# Patient Record
Sex: MALE
[De-identification: names, ages, dates, MRNs, and addresses within clinical notes are randomized per-mention and may not be internally consistent; named-entity substitution may affect disease eponyms.]

---

## 2018-11-15 RX ORDER — METOPROLOL SUCCINATE 25 MG/1
TABLET, EXTENDED RELEASE ORAL
Qty: 90 TABLET | Refills: 3 | Status: SHIPPED | OUTPATIENT
Start: 2018-11-15 | End: 2019-02-22 | Stop reason: SDUPTHER

## 2018-12-04 ENCOUNTER — IMPORTED ENCOUNTER (OUTPATIENT)
Age: 63
End: 2018-12-04

## 2018-12-28 ENCOUNTER — OFFICE VISIT (OUTPATIENT)
Dept: CARDIOLOGY CLINIC | Age: 63
End: 2018-12-28

## 2018-12-28 VITALS
RESPIRATION RATE: 16 BRPM | HEART RATE: 61 BPM | HEIGHT: 71 IN | WEIGHT: 183 LBS | SYSTOLIC BLOOD PRESSURE: 126 MMHG | DIASTOLIC BLOOD PRESSURE: 78 MMHG | BODY MASS INDEX: 25.62 KG/M2

## 2018-12-28 DIAGNOSIS — R00.2 PALPITATIONS: Primary | ICD-10-CM

## 2018-12-28 PROCEDURE — 99214 OFFICE O/P EST MOD 30 MIN: CPT | Performed by: INTERNAL MEDICINE

## 2018-12-28 PROCEDURE — 93000 ELECTROCARDIOGRAM COMPLETE: CPT | Performed by: INTERNAL MEDICINE

## 2018-12-28 RX ORDER — TADALAFIL 20 MG/1
20 TABLET ORAL PRN
Status: ON HOLD | COMMUNITY
End: 2019-06-10 | Stop reason: ALTCHOICE

## 2018-12-28 NOTE — PROGRESS NOTES
CHIEF COMPLAINT: Chest pain    HISTORY OF PRESENT ILLNESS: Patient is a 58 y.o. male seen at the request of Sherwin Bloch, MD.      Patient presents for second opinion on his ongoing chest pain. States he has had exertional chest pains since last winter. Not particularly progressive, but predictable with exertion. Mild SOTO. Also has chronic issues with frequent PVC's.      Past Medical History:   Diagnosis Date    Benign prostatic hypertrophy     Cancer ([facility] [address] 2015    skin / basal / stomach     Dysphagia     ED (erectile dysfunction)     GERD (gastroesophageal reflux disease)     Heart palpitations     follows with Dr. Nahid Gill HTN (hypertension)     Hyperlipidemia, mixed     Hypothyroidism     Mitral valve prolapse 1988    Dr. Onofre Mireles yearly    Osteoarthritis     both  knee    PVC's (premature ventricular contractions)     follows with Dr. Shorty Rodriguez yearly       Patient Active Problem List   Diagnosis    Papilloma of nasopharynx    Essential hypertension    Pure hypercholesterolemia    Palpitations    Symptomatic PVCs    Acquired hypothyroidism    Atrial bigeminy       No Known Allergies    Current Outpatient Prescriptions   Medication Sig Dispense Refill    tadalafil (CIALIS) 20 MG tablet Take 20 mg by mouth as needed for Erectile Dysfunction      metoprolol succinate (TOPROL XL) 25 MG extended release tablet TAKE ONE TABLET BY MOUTH ONE TIME A DAY 90 tablet 3    levothyroxine (SYNTHROID) 25 MCG tablet Take 25 mcg by mouth Daily      aspirin EC 81 MG EC tablet Take 1 tablet by mouth daily 30 tablet 3    Coenzyme Q10 (COQ-10 PO) Take by mouth daily LD 2/6/2018      vitamin D (CHOLECALCIFEROL) 1000 UNIT TABS tablet Take 1,000 Units by mouth daily LD 2/6/2018      Multiple Vitamin (MULTI VITAMIN DAILY PO) Take by mouth daily LD 2/6/2018      simvastatin (ZOCOR) 10 MG tablet Take 10 mg by mouth daily      sucralfate (CARAFATE) 1 GM tablet Take 1 tablet by mouth 4 times daily 120 tablet 3

## 2019-02-22 RX ORDER — METOPROLOL SUCCINATE 25 MG/1
TABLET, EXTENDED RELEASE ORAL
Qty: 90 TABLET | Refills: 3 | Status: SHIPPED | OUTPATIENT
Start: 2019-02-22 | End: 2019-09-05 | Stop reason: SDUPTHER

## 2019-05-14 ENCOUNTER — PREP FOR PROCEDURE (OUTPATIENT)
Dept: SURGERY | Age: 64
End: 2019-05-14

## 2019-05-14 RX ORDER — SODIUM CHLORIDE 9 MG/ML
INJECTION, SOLUTION INTRAVENOUS CONTINUOUS
Status: CANCELLED | OUTPATIENT
Start: 2019-05-14

## 2019-05-14 NOTE — H&P
Name: Juanita Dunlap               : 1955 Sex: M  Age: 58 yrs  Acct#:  [id number]            Patient was referred by Tiera Peraza M.D..  Patient's primary care provider is Tiera Peraza M.D.. CC: Patient presents for follow up of gerd    HPI: Follow-up. Patient is s/p EGD and GERD treatment. Suspected of having Terry without proof on Bx. Symptoms have been controlled. No complaints. Not on any medications for GERD. Meds Prior to Visit:  Suprep Bowel Prep    as directed  Diltiazem HCL     Simvastatin     Viagra     Metoprolol Tartrate  25 mg  1/2 by mouth twice a day  Aspirin  81 mg   Prilosec  20 mg  twice a day  Revatio  20 mg   Synthroid  25 mcg  daily     Allergies:  NKDA    PMH:  Problem List: Screening for malignant neoplasm of colon, Dysphagia, Gastro-esophageal reflux disease with esophagitis  (Health Maintenance)  (Medical Problems)  Surgical Hx:  Removal Of [place]-Throat - (2013)  Reviewed, no changes. FH:  Father:  Alive  Thyroid Disease  Knee Injury  Gallstones. Mother:    Ovarian Cancer. Reviewed, no changes. SH:  Personal Habits:  Smoking: Patient has never smoked. Alcohol: Current Alcohol Use; Social.Drug Use: Denies Drug Use. Daily Caffeine: Uses Caffeine. Reviewed, no changes. Date: 2019  Was the patient queried about smoking behavior? Yes  No  Does the patient currently smoke? Smoking: Patient has never smoked. ROS:  Const: Denies anorexia, anxiety, fatigue, night sweats, weight gain and weight loss. Eyes: Denies eye symptoms. ENMT: Denies ear symptoms. Denies nasal symptoms. Denies mouth or throat symptoms. CV: Reports mitral valve prolapse, but denies hypertension and other cardiovascular symptoms. Resp: Denies respiratory symptoms. GI: Denies hepatitis, liver disease and other gastrointestinal symptoms. Musculo: Denies musculoskeletal symptoms. Skin: Denies skin, hair and nail symptoms. Breast: Denies breast problems.   Neuro: Denies neurologic symptoms. Psych: Denies depression and substance abuse. Endocrine: Denies diabetes, kidney disease and thyroid disease. Hema/Lymph: Denies anemia, blood disease, cancer and past transfusion. Allergy/Immuno: Denies immunosuppression. Reviewed, no changes. Wt Prior: 186lb as of 11/02/16    Exam:  Const: Appears healthy and well developed. No signs of apparent distress present. Head/Face: Atraumatic, normocephalic on inspection. Eyes: Conjunctivae pink. Pupils equal, round and reactive to light. Sclerae clear and anicteric. ENMT: External ears WNL. External nose WNL. Lips: Appear normal and healthy. Neck: Normal to inspection. Normal to palpation. No masses appreciated. Trachea midline. Thyroid is normal in size. No jugular venous distention. Resp: Respiration rate is normal. No wheezing. Clear to auscultation bilaterally. No rales or rhonchi appreciated over the lungs bilaterally. CV: Rate is regular. Rhythm is regular. S1 is normal. S2 is normal. No heart murmur appreciated. Extremities: No clubbing or cyanosis. No edema of the lower limbs bilaterally. Abdomen: No visible herniations. No abdominal scars. Positive bowel sounds in all quadrants. Abdomen is soft, nontender, and nondistended without guarding, rigidity or rebound tenderness. No palpable abdominal aneurysms present. No palpable hepatosplenomegaly. Lymph: No palpable lymphadenopathy in the cervical, supraclavicular, axillary and inguinal region(s). Musculo: Walks with a normal gait. Upper Extremities: Normal to inspection. ROM: Full ROM bilaterally. Lower Extremities: Normal to inspection. ROM: Full ROM bilaterally. Skin: Skin warm and dry with no evidence of unusual rashes or suspicious lesions. Neuro: Alert and oriented x3. Mood is normal.  Cranial Nerves: Cranial nerves II-XII grossly intact. Psych: Cognition: Judgment and insight are grossly intact.          Assessment #1: Hx K21.0 Gastro-esophageal reflux disease with

## 2019-05-28 ENCOUNTER — HOSPITAL ENCOUNTER (OUTPATIENT)
Age: 64
Discharge: HOME | End: 2019-05-28
Payer: MEDICARE

## 2019-05-28 LAB — PROSTATE SPECIFIC ANTIGEN: 0.31 NG/ML (ref 0–4)

## 2019-05-28 PROCEDURE — G0103 PSA SCREENING: HCPCS

## 2019-05-28 PROCEDURE — 36415 COLL VENOUS BLD VENIPUNCTURE: CPT

## 2019-06-06 NOTE — PROGRESS NOTES
To PRE-ADMISSION TESTING INSTRUCTIONS    The Preadmission Testing patient is instructed accordingly using the following criteria (check applicable):    ARRIVAL INSTRUCTIONS:  [x] Parking the day of Surgery is located in the Main Entrance lot. Upon entering the door, make an immediate right to the surgery reception desk    [] [id number] is available Monday through Friday 6 am to 6 pm    [x] Bring photo ID and insurance card    [] Bring in a copy of Living will or Durable Power of  papers. [x] Please be sure to arrange for responsible adult to provide transportation to and from the hospital    [x] Please arrange for responsible adult to be with you for the 24 hour period post procedure due to having anesthesia      GENERAL INSTRUCTIONS:    [x] Nothing by mouth after midnight, including gum, candy, mints or water    [x] You may brush your teeth, but do not swallow any water    [x] Take medications as instructed with 1-2 oz of water    [x] Stop herbal supplements and vitamins 5 days prior to procedure    [x] Follow preop dosing of blood thinners per physician instructions    [] Take 1/2 dose of evening insulin, but no insulin after midnight    [] No oral diabetic medications after midnight    [] If diabetic and have low blood sugar or feel symptomatic, take 1-2oz apple juice only    [] Bring inhalers day of surgery    [] Bring C-PAP/ Bi-Pap day of surgery    [] Bring urine specimen day of surgery    [x] Shower or bath with soap, lather and rinse well, AM of Surgery, no lotion, powders or creams to surgical site    [] Follow bowel prep as instructed per surgeon    [x] No tobacco products within 24 hours of surgery     [x] No alcohol or illegal drug use within 24 hours of surgery.     [x] Jewelry, body piercing's, eyeglasses, contact lenses and dentures are not permitted into surgery (bring cases)      [] Please do not wear any nail polish, make up or hair products on the day of surgery    [x] If not already done, you can expect a call from registration    [x] You can expect a call the business day prior to procedure to notify you if your arrival time changes    [x] If you receive a survey after surgery we would greatly appreciate your comments    [] Parent/guardian of a minor must accompany their child and remain on the premises  the entire time they are under our care     [] Pediatric patients may bring favorite toy, blanket or comfort item with them    [] A caregiver or family member must remain with the patient during their stay if they are mentally handicapped, have dementia, disoriented or unable to use a call light or would be a safety concern if left unattended    [x] Please notify surgeon if you develop any illness between now and time of surgery (cold, cough, sore throat, fever, nausea, vomiting) or any signs of infections  including skin, wounds, and dental.    [x]  The Outpatient Pharmacy is available to fill your prescription here on your day of surgery, ask your preop nurse for details    [x] Other instructions: Wear comfortable clothing    EDUCATIONAL MATERIALS PROVIDED:    [] PAT Preoperative Education Packet/Booklet     [] Medication List    [] Fluoroscopy Information Pamphlet    [] Transfusion bracelet applied with instructions    [] Joint replacement video reviewed    [] Shower with soap, lather and rinse well, and use CHG wipes provided the evening before surgery as instructed

## 2019-06-10 ENCOUNTER — ANESTHESIA (OUTPATIENT)
Dept: ENDOSCOPY | Age: 64
End: 2019-06-10
Payer: MEDICARE

## 2019-06-10 ENCOUNTER — ANESTHESIA EVENT (OUTPATIENT)
Dept: ENDOSCOPY | Age: 64
End: 2019-06-10
Payer: MEDICARE

## 2019-06-10 ENCOUNTER — PREP FOR PROCEDURE (OUTPATIENT)
Dept: SURGERY | Age: 64
End: 2019-06-10

## 2019-06-10 ENCOUNTER — HOSPITAL ENCOUNTER (OUTPATIENT)
Age: 64
Setting detail: OUTPATIENT SURGERY
Discharge: HOME | End: 2019-06-10
Attending: SURGERY | Admitting: SURGERY
Payer: MEDICARE

## 2019-06-10 VITALS — OXYGEN SATURATION: 95 % | SYSTOLIC BLOOD PRESSURE: 115 MMHG | DIASTOLIC BLOOD PRESSURE: 65 MMHG

## 2019-06-10 VITALS
OXYGEN SATURATION: 98 % | DIASTOLIC BLOOD PRESSURE: 73 MMHG | RESPIRATION RATE: 16 BRPM | TEMPERATURE: 97.3 F | BODY MASS INDEX: 24.36 KG/M2 | HEIGHT: 71 IN | SYSTOLIC BLOOD PRESSURE: 115 MMHG | HEART RATE: 51 BPM | WEIGHT: 174 LBS

## 2019-06-10 PROCEDURE — 3609012400 HC EGD TRANSORAL BIOPSY SINGLE/MULTIPLE: Performed by: SURGERY

## 2019-06-10 PROCEDURE — 88342 IMHCHEM/IMCYTCHM 1ST ANTB: CPT

## 2019-06-10 PROCEDURE — 6360000002 HC RX W HCPCS: Performed by: NURSE ANESTHETIST, CERTIFIED REGISTERED

## 2019-06-10 PROCEDURE — 2580000003 HC RX 258: Performed by: SURGERY

## 2019-06-10 PROCEDURE — 3700000000 HC ANESTHESIA ATTENDED CARE: Performed by: SURGERY

## 2019-06-10 PROCEDURE — 7100000010 HC PHASE II RECOVERY - FIRST 15 MIN: Performed by: SURGERY

## 2019-06-10 PROCEDURE — 2709999900 HC NON-CHARGEABLE SUPPLY: Performed by: SURGERY

## 2019-06-10 PROCEDURE — 88305 TISSUE EXAM BY PATHOLOGIST: CPT

## 2019-06-10 PROCEDURE — 7100000011 HC PHASE II RECOVERY - ADDTL 15 MIN: Performed by: SURGERY

## 2019-06-10 RX ORDER — SODIUM CHLORIDE 9 MG/ML
INJECTION, SOLUTION INTRAVENOUS CONTINUOUS
Status: DISCONTINUED | OUTPATIENT
Start: 2019-06-10 | End: 2019-06-10 | Stop reason: HOSPADM

## 2019-06-10 RX ORDER — SILDENAFIL CITRATE 20 MG/1
20 TABLET ORAL PRN
COMMUNITY

## 2019-06-10 RX ORDER — PROPOFOL 10 MG/ML
INJECTION, EMULSION INTRAVENOUS PRN
Status: DISCONTINUED | OUTPATIENT
Start: 2019-06-10 | End: 2019-06-10 | Stop reason: SDUPTHER

## 2019-06-10 RX ADMIN — PROPOFOL 200 MG: 10 INJECTION, EMULSION INTRAVENOUS at 11:53

## 2019-06-10 RX ADMIN — SODIUM CHLORIDE: 9 INJECTION, SOLUTION INTRAVENOUS at 11:50

## 2019-06-10 RX ADMIN — SODIUM CHLORIDE: 9 INJECTION, SOLUTION INTRAVENOUS at 11:36

## 2019-06-10 ASSESSMENT — PAIN DESCRIPTION - PAIN TYPE
TYPE: SURGICAL PAIN
TYPE: SURGICAL PAIN

## 2019-06-10 ASSESSMENT — PAIN SCALES - GENERAL
PAINLEVEL_OUTOF10: 0
PAINLEVEL_OUTOF10: 0

## 2019-06-10 NOTE — ANESTHESIA PRE PROCEDURE
hypertrophy     Cancer ([facility] [address] 2015    skin / basal / stomach     ED (erectile dysfunction)     GERD (gastroesophageal reflux disease)     HTN (hypertension)     Hyperlipidemia, mixed     Hypothyroidism     Mitral valve prolapse 1988    Dr. Nunu fajardo    Osteoarthritis     both  knee    PVC's (premature ventricular contractions)     follows with Dr. Sarbjit Santiago       Past Surgical History:        Procedure Laterality Date    COLONOSCOPY  12/12/2016    Dr. Lorenzo Mcclain, COLON, DIAGNOSTIC      OTHER SURGICAL HISTORY  2/27/13    excision of nasopharyngeal papilloma    UPPER GASTROINTESTINAL ENDOSCOPY  02/12/2018    VASECTOMY         Social History:    Social History     Tobacco Use    Smoking status: Never Smoker    Smokeless tobacco: Never Used   Substance Use Topics    Alcohol use: Yes     Comment: social                                Counseling given: Not Answered      Vital Signs (Current):   Vitals:    06/06/19 1038   Weight: 174 lb (78.9 kg)   Height: 5' 11\" (1.803 m)                                              BP Readings from Last 3 Encounters:   12/28/18 126/78   02/12/18 113/70   11/17/17 130/80       NPO Status: Time of last liquid consumption: 2200                                                                               BMI:   Wt Readings from Last 3 Encounters:   06/06/19 174 lb (78.9 kg)   12/28/18 183 lb (83 kg)   02/12/18 185 lb (83.9 kg)     Body mass index is 24.27 kg/m².     CBC:   Lab Results   Component Value Date    WBC 3.9 12/26/2017    RBC 4.95 12/26/2017    HGB 14.7 12/26/2017    HCT 45.1 12/26/2017    MCV 91.1 12/26/2017    RDW 12.8 12/26/2017     12/26/2017       CMP:   Lab Results   Component Value Date     12/26/2017    K 4.5 12/26/2017     12/26/2017    CO2 29 12/26/2017    BUN 14 12/26/2017    CREATININE 0.9 12/26/2017    GFRAA >60 12/26/2017    LABGLOM >60 12/26/2017    GLUCOSE 103 12/26/2017    GLUCOSE 80 11/28/2011    PROT 6.7

## 2019-06-11 ENCOUNTER — HOSPITAL ENCOUNTER (OUTPATIENT)
Age: 64
Discharge: HOME | End: 2019-06-11
Payer: MEDICARE

## 2019-06-11 LAB
ALBUMIN SERPL-MCNC: 4.3 G/DL (ref 3.5–5.2)
ALP BLD-CCNC: 65 U/L (ref 40–129)
ALT SERPL-CCNC: 18 U/L (ref 0–40)
ANION GAP SERPL CALCULATED.3IONS-SCNC: 8 MMOL/L (ref 7–16)
AST SERPL-CCNC: 19 U/L (ref 0–39)
BILIRUB SERPL-MCNC: 0.4 MG/DL (ref 0–1.2)
BUN BLDV-MCNC: 11 MG/DL (ref 8–23)
CALCIUM SERPL-MCNC: 9 MG/DL (ref 8.6–10.2)
CHLORIDE BLD-SCNC: 107 MMOL/L (ref 98–107)
CHOLESTEROL, TOTAL: 163 MG/DL (ref 0–199)
CO2: 26 MMOL/L (ref 22–29)
CREAT SERPL-MCNC: 0.9 MG/DL (ref 0.7–1.2)
GFR AFRICAN AMERICAN: >60
GFR NON-AFRICAN AMERICAN: >60 ML/MIN/1.73
GLUCOSE BLD-MCNC: 97 MG/DL (ref 74–99)
HCT VFR BLD CALC: 43.1 % (ref 37–54)
HDLC SERPL-MCNC: 75 MG/DL
HEMOGLOBIN: 14 G/DL (ref 12.5–16.5)
LDL CHOLESTEROL CALCULATED: 78 MG/DL (ref 0–99)
MCH RBC QN AUTO: 30.6 PG (ref 26–35)
MCHC RBC AUTO-ENTMCNC: 32.5 % (ref 32–34.5)
MCV RBC AUTO: 94.1 FL (ref 80–99.9)
PDW BLD-RTO: 13.8 FL (ref 11.5–15)
PLATELET # BLD: 302 E9/L (ref 130–450)
PMV BLD AUTO: 10.8 FL (ref 7–12)
POTASSIUM SERPL-SCNC: 4.7 MMOL/L (ref 3.5–5)
RBC # BLD: 4.58 E12/L (ref 3.8–5.8)
SODIUM BLD-SCNC: 141 MMOL/L (ref 132–146)
TOTAL PROTEIN: 6.6 G/DL (ref 6.4–8.3)
TRIGL SERPL-MCNC: 50 MG/DL (ref 0–149)
TSH SERPL DL<=0.05 MIU/L-ACNC: 5.43 UIU/ML (ref 0.27–4.2)
VLDLC SERPL CALC-MCNC: 10 MG/DL
WBC # BLD: 4.5 E9/L (ref 4.5–11.5)

## 2019-06-11 PROCEDURE — 85027 COMPLETE CBC AUTOMATED: CPT

## 2019-06-11 PROCEDURE — 80053 COMPREHEN METABOLIC PANEL: CPT

## 2019-06-11 PROCEDURE — 36415 COLL VENOUS BLD VENIPUNCTURE: CPT

## 2019-06-11 PROCEDURE — 80061 LIPID PANEL: CPT

## 2019-06-11 PROCEDURE — 84443 ASSAY THYROID STIM HORMONE: CPT

## 2019-07-02 ENCOUNTER — HOSPITAL ENCOUNTER (OUTPATIENT)
Dept: CT IMAGING | Age: 64
Discharge: HOME | End: 2019-07-04
Payer: MEDICARE

## 2019-07-02 DIAGNOSIS — R63.4 LOSS OF WEIGHT: ICD-10-CM

## 2019-07-02 PROCEDURE — 6360000004 HC RX CONTRAST MEDICATION: Performed by: RADIOLOGY

## 2019-07-02 PROCEDURE — 71270 CT THORAX DX C-/C+: CPT

## 2019-07-02 PROCEDURE — 2580000003 HC RX 258: Performed by: RADIOLOGY

## 2019-07-02 PROCEDURE — 74170 CT ABD WO CNTRST FLWD CNTRST: CPT

## 2019-07-02 RX ORDER — SODIUM CHLORIDE 0.9 % (FLUSH) 0.9 %
10 SYRINGE (ML) INJECTION PRN
Status: COMPLETED | OUTPATIENT
Start: 2019-07-02 | End: 2019-07-02

## 2019-07-02 RX ADMIN — IOPAMIDOL 90 ML: 755 INJECTION, SOLUTION INTRAVENOUS at 16:02

## 2019-07-02 RX ADMIN — Medication 10 ML: at 16:02

## 2019-07-02 RX ADMIN — IOHEXOL 50 ML: 240 INJECTION, SOLUTION INTRATHECAL; INTRAVASCULAR; INTRAVENOUS; ORAL at 16:02

## 2019-09-03 ENCOUNTER — INITIAL CONSULT (OUTPATIENT)
Dept: CARDIOTHORACIC SURGERY | Age: 64
End: 2019-09-03
Payer: MEDICARE

## 2019-09-03 VITALS
WEIGHT: 175 LBS | HEIGHT: 71 IN | SYSTOLIC BLOOD PRESSURE: 129 MMHG | BODY MASS INDEX: 24.5 KG/M2 | DIASTOLIC BLOOD PRESSURE: 80 MMHG | HEART RATE: 76 BPM

## 2019-09-03 DIAGNOSIS — I71.21 ASCENDING AORTIC ANEURYSM: Primary | ICD-10-CM

## 2019-09-03 PROCEDURE — 99203 OFFICE O/P NEW LOW 30 MIN: CPT | Performed by: THORACIC SURGERY (CARDIOTHORACIC VASCULAR SURGERY)

## 2019-09-03 PROCEDURE — 1123F ACP DISCUSS/DSCN MKR DOCD: CPT | Performed by: THORACIC SURGERY (CARDIOTHORACIC VASCULAR SURGERY)

## 2019-09-03 PROCEDURE — 4040F PNEUMOC VAC/ADMIN/RCVD: CPT | Performed by: THORACIC SURGERY (CARDIOTHORACIC VASCULAR SURGERY)

## 2019-09-03 PROCEDURE — G8420 CALC BMI NORM PARAMETERS: HCPCS | Performed by: THORACIC SURGERY (CARDIOTHORACIC VASCULAR SURGERY)

## 2019-09-03 PROCEDURE — 1036F TOBACCO NON-USER: CPT | Performed by: THORACIC SURGERY (CARDIOTHORACIC VASCULAR SURGERY)

## 2019-09-03 PROCEDURE — 3017F COLORECTAL CA SCREEN DOC REV: CPT | Performed by: THORACIC SURGERY (CARDIOTHORACIC VASCULAR SURGERY)

## 2019-09-03 PROCEDURE — G8428 CUR MEDS NOT DOCUMENT: HCPCS | Performed by: THORACIC SURGERY (CARDIOTHORACIC VASCULAR SURGERY)

## 2019-09-03 RX ORDER — AMLODIPINE BESYLATE 5 MG/1
5 TABLET ORAL DAILY
COMMUNITY

## 2019-09-03 ASSESSMENT — ENCOUNTER SYMPTOMS
COUGH: 0
SHORTNESS OF BREATH: 0

## 2019-09-03 NOTE — PROGRESS NOTES
Subjective:      Chief Complaint   Patient presents with    Consultation     Dr Anamaria Nino thoracic aneurysm       Patient ID: Romy Xiao is a 71 y.o. male who presents to office upon referral from his PCP for continued evaluation of his AscAA. Patient recently underwent CT scan on 7/2/19 during work up for unexplained weight loss. This showed an incidental 3.6cm AscAA  Patient denies any CP, back pain or SOB    HPI    Review of Systems   Constitutional: Negative for chills and fever. Respiratory: Negative for cough and shortness of breath. Cardiovascular: Negative for chest pain and palpitations. Neurological: Negative for syncope. Objective:   Physical Exam   Constitutional: He is oriented to person, place, and time. He appears well-developed and well-nourished. Cardiovascular: Normal rate, regular rhythm and normal heart sounds. Pulmonary/Chest: Effort normal and breath sounds normal.   Abdominal: Soft. Bowel sounds are normal.   Musculoskeletal: Normal range of motion. Neurological: He is alert and oriented to person, place, and time. Assessment:       3.6cm Asc AA, trileaflet valve      Plan: This borderlines on whether I even consider this to be an \"aneurysm. \"  He has a trileaflet valve. Check CT in 1 year and if stable I likely will not recommend any further follow up.         Olga Tinsley MD

## 2019-09-05 RX ORDER — METOPROLOL SUCCINATE 25 MG/1
TABLET, EXTENDED RELEASE ORAL
Qty: 90 TABLET | Refills: 3 | Status: SHIPPED
Start: 2019-09-05 | End: 2020-08-12

## 2019-11-07 ENCOUNTER — TELEPHONE (OUTPATIENT)
Dept: CARDIOLOGY CLINIC | Age: 64
End: 2019-11-07

## 2019-11-19 ENCOUNTER — OFFICE VISIT (OUTPATIENT)
Dept: CARDIOLOGY CLINIC | Age: 64
End: 2019-11-19
Payer: MEDICARE

## 2019-11-19 VITALS
DIASTOLIC BLOOD PRESSURE: 64 MMHG | WEIGHT: 174 LBS | BODY MASS INDEX: 24.36 KG/M2 | HEART RATE: 65 BPM | SYSTOLIC BLOOD PRESSURE: 122 MMHG | HEIGHT: 71 IN | RESPIRATION RATE: 16 BRPM

## 2019-11-19 DIAGNOSIS — R00.2 PALPITATIONS: Primary | ICD-10-CM

## 2019-11-19 PROCEDURE — G8427 DOCREV CUR MEDS BY ELIG CLIN: HCPCS | Performed by: INTERNAL MEDICINE

## 2019-11-19 PROCEDURE — G8484 FLU IMMUNIZE NO ADMIN: HCPCS | Performed by: INTERNAL MEDICINE

## 2019-11-19 PROCEDURE — 1123F ACP DISCUSS/DSCN MKR DOCD: CPT | Performed by: INTERNAL MEDICINE

## 2019-11-19 PROCEDURE — 93000 ELECTROCARDIOGRAM COMPLETE: CPT | Performed by: INTERNAL MEDICINE

## 2019-11-19 PROCEDURE — 3017F COLORECTAL CA SCREEN DOC REV: CPT | Performed by: INTERNAL MEDICINE

## 2019-11-19 PROCEDURE — G8420 CALC BMI NORM PARAMETERS: HCPCS | Performed by: INTERNAL MEDICINE

## 2019-11-19 PROCEDURE — 4040F PNEUMOC VAC/ADMIN/RCVD: CPT | Performed by: INTERNAL MEDICINE

## 2019-11-19 PROCEDURE — 99214 OFFICE O/P EST MOD 30 MIN: CPT | Performed by: INTERNAL MEDICINE

## 2019-11-19 PROCEDURE — 1036F TOBACCO NON-USER: CPT | Performed by: INTERNAL MEDICINE

## 2019-11-19 RX ORDER — MULTIVITAMIN WITH IRON
250 TABLET ORAL DAILY
COMMUNITY

## 2019-11-19 RX ORDER — COVID-19 ANTIGEN TEST
KIT MISCELLANEOUS PRN
COMMUNITY
End: 2022-05-17

## 2019-12-05 ENCOUNTER — HOSPITAL ENCOUNTER (OUTPATIENT)
Age: 64
Discharge: HOME | End: 2019-12-07

## 2019-12-05 LAB
ABO/RH: NORMAL
ANION GAP SERPL CALCULATED.3IONS-SCNC: 8 MMOL/L (ref 7–16)
ANTIBODY SCREEN: NORMAL
BUN BLDV-MCNC: 11 MG/DL (ref 8–23)
CALCIUM SERPL-MCNC: 9.4 MG/DL (ref 8.6–10.2)
CHLORIDE BLD-SCNC: 98 MMOL/L (ref 98–107)
CO2: 28 MMOL/L (ref 22–29)
CREAT SERPL-MCNC: 0.9 MG/DL (ref 0.7–1.2)
GFR AFRICAN AMERICAN: >60
GFR NON-AFRICAN AMERICAN: >60 ML/MIN/1.73
GLUCOSE BLD-MCNC: 118 MG/DL (ref 74–99)
HCT VFR BLD CALC: 43.6 % (ref 37–54)
HEMOGLOBIN: 14.9 G/DL (ref 12.5–16.5)
MCH RBC QN AUTO: 31.4 PG (ref 26–35)
MCHC RBC AUTO-ENTMCNC: 34.2 % (ref 32–34.5)
MCV RBC AUTO: 91.8 FL (ref 80–99.9)
PDW BLD-RTO: 12.9 FL (ref 11.5–15)
PLATELET # BLD: 312 E9/L (ref 130–450)
PMV BLD AUTO: 10.6 FL (ref 7–12)
POTASSIUM SERPL-SCNC: 4.5 MMOL/L (ref 3.5–5)
RBC # BLD: 4.75 E12/L (ref 3.8–5.8)
SODIUM BLD-SCNC: 134 MMOL/L (ref 132–146)
WBC # BLD: 5.1 E9/L (ref 4.5–11.5)

## 2019-12-05 PROCEDURE — 86901 BLOOD TYPING SEROLOGIC RH(D): CPT

## 2019-12-05 PROCEDURE — 87088 URINE BACTERIA CULTURE: CPT

## 2019-12-05 PROCEDURE — 87081 CULTURE SCREEN ONLY: CPT

## 2019-12-05 PROCEDURE — 85027 COMPLETE CBC AUTOMATED: CPT

## 2019-12-05 PROCEDURE — 80048 BASIC METABOLIC PNL TOTAL CA: CPT

## 2019-12-05 PROCEDURE — 86900 BLOOD TYPING SEROLOGIC ABO: CPT

## 2019-12-05 PROCEDURE — 86850 RBC ANTIBODY SCREEN: CPT

## 2019-12-07 LAB
MRSA CULTURE ONLY: NORMAL
URINE CULTURE, ROUTINE: NORMAL

## 2019-12-12 ENCOUNTER — HOSPITAL ENCOUNTER (OUTPATIENT)
Age: 64
Discharge: HOME | End: 2019-12-14

## 2019-12-12 LAB
ANION GAP SERPL CALCULATED.3IONS-SCNC: 15 MMOL/L (ref 7–16)
BUN BLDV-MCNC: 12 MG/DL (ref 8–23)
CALCIUM SERPL-MCNC: 8.5 MG/DL (ref 8.6–10.2)
CHLORIDE BLD-SCNC: 98 MMOL/L (ref 98–107)
CO2: 23 MMOL/L (ref 22–29)
CREAT SERPL-MCNC: 1 MG/DL (ref 0.7–1.2)
GFR AFRICAN AMERICAN: >60
GFR NON-AFRICAN AMERICAN: >60 ML/MIN/1.73
GLUCOSE BLD-MCNC: 156 MG/DL (ref 74–99)
HCT VFR BLD CALC: 34.4 % (ref 37–54)
HEMOGLOBIN: 11 G/DL (ref 12.5–16.5)
POTASSIUM SERPL-SCNC: 3.7 MMOL/L (ref 3.5–5)
SODIUM BLD-SCNC: 136 MMOL/L (ref 132–146)

## 2019-12-12 PROCEDURE — 80048 BASIC METABOLIC PNL TOTAL CA: CPT

## 2019-12-12 PROCEDURE — 85018 HEMOGLOBIN: CPT

## 2019-12-12 PROCEDURE — 85014 HEMATOCRIT: CPT

## 2020-06-23 ENCOUNTER — HOSPITAL ENCOUNTER (OUTPATIENT)
Age: 65
Discharge: HOME | End: 2020-06-23
Payer: MEDICARE

## 2020-06-23 LAB — PROSTATE SPECIFIC ANTIGEN: 0.23 NG/ML (ref 0–4)

## 2020-06-23 PROCEDURE — 36415 COLL VENOUS BLD VENIPUNCTURE: CPT

## 2020-06-23 PROCEDURE — G0103 PSA SCREENING: HCPCS

## 2020-08-12 RX ORDER — METOPROLOL SUCCINATE 25 MG/1
TABLET, EXTENDED RELEASE ORAL
Qty: 90 TABLET | Refills: 0 | Status: SHIPPED
Start: 2020-08-12 | End: 2020-11-16

## 2020-11-16 RX ORDER — METOPROLOL SUCCINATE 25 MG/1
TABLET, EXTENDED RELEASE ORAL
Qty: 90 TABLET | Refills: 0 | Status: SHIPPED
Start: 2020-11-16 | End: 2021-03-18

## 2020-12-04 ENCOUNTER — HOSPITAL ENCOUNTER (OUTPATIENT)
Dept: CT IMAGING | Age: 65
Discharge: HOME | End: 2020-12-06
Payer: MEDICARE

## 2020-12-04 PROCEDURE — 71250 CT THORAX DX C-: CPT

## 2020-12-08 ENCOUNTER — VIRTUAL VISIT (OUTPATIENT)
Dept: CARDIOTHORACIC SURGERY | Age: 65
End: 2020-12-08
Payer: MEDICARE

## 2020-12-08 PROCEDURE — 99441 PR PHYS/QHP TELEPHONE EVALUATION 5-10 MIN: CPT | Performed by: THORACIC SURGERY (CARDIOTHORACIC VASCULAR SURGERY)

## 2020-12-08 NOTE — PROGRESS NOTES
Chief Complaint   Patient presents with    Other     1 year f/u thoracic aneurysm            HPI:  Mr. Abdi Silva is a 62-year-old male whom is evaluated via telephone due to the covid 19 pandemic for routine surveillance of an ascending aortic aneurysm. Recent CT of the chest done reveals a stable ascending thoracic aortic aneurysm measuring approximately 3.6 cm. Previous ascending aortic aneurysm measurement in 2019 was 3.6 cm. He has a trileaflet AV. He denies any SOB, CP, back pain, or any major complaints. He continues to take antihypertensive medication, and is a nonsmoker. Review of Systems:   Constitutional: Negative for fever and chills. Respiratory: Negative for cough, chest tightness and shortness of breath. Cardiovascular: Negative for chest pain, palpitations and leg swelling. Musculoskeletal: Negative for back pain. Neurological: Negative for dizziness, syncope and light-headedness. Objective:   Physical Exam   Constitutional: oriented to person, place, and time. No distress. Remaining PE deferred due to telephone encounter. Assessment:      3.6 cm ascending aortic \"aneurysm\" trileaflet valve      Plan:      I do not think we need to follow this. Follow up PRN. I told him I would defer to his PCP for his small lung nodules. Importance of BP control reinforced         Pablo Don is a 76 y.o. male evaluated via telephone on 12/8/2020. Consent:  He and/or health care decision maker is aware that that he may receive a bill for this telephone service, depending on his insurance coverage, and has provided verbal consent to proceed: Yes      Documentation:  I communicated with the patient and/or health care decision maker about above.    Details of this discussion including any medical advice provided: as above      I affirm this is a Patient Initiated Episode with a Patient who has not had a related appointment within my department in the past 7 days or scheduled within the next 24 hours.     Patient identification was verified at the start of the visit: Yes    Total Time: minutes: 5-10 minutes    Note: not billable if this call serves to triage the patient into an appointment for the relevant concern      Epifanio Gan MD

## 2021-01-19 ENCOUNTER — IMPORTED ENCOUNTER (OUTPATIENT)
Age: 66
End: 2021-01-19

## 2021-01-19 PROBLEM — H25.13: Noted: 2021-01-19

## 2021-01-19 PROBLEM — H43.813: Noted: 2021-01-19

## 2021-01-19 PROBLEM — H52.223: Noted: 2021-01-19

## 2021-02-12 ENCOUNTER — IMMUNIZATION (OUTPATIENT)
Dept: PRIMARY CARE CLINIC | Age: 66
End: 2021-02-12
Payer: MEDICARE

## 2021-02-12 PROCEDURE — 91300 COVID-19, PFIZER VACCINE 30MCG/0.3ML DOSE: CPT | Performed by: CLINICAL NURSE SPECIALIST

## 2021-02-12 PROCEDURE — 0001A COVID-19, PFIZER VACCINE 30MCG/0.3ML DOSE: CPT | Performed by: CLINICAL NURSE SPECIALIST

## 2021-03-08 ENCOUNTER — IMMUNIZATION (OUTPATIENT)
Dept: PRIMARY CARE CLINIC | Age: 66
End: 2021-03-08
Payer: MEDICARE

## 2021-03-08 PROCEDURE — 91300 COVID-19, PFIZER VACCINE 30MCG/0.3ML DOSE: CPT | Performed by: PHYSICIAN ASSISTANT

## 2021-03-08 PROCEDURE — 0002A COVID-19, PFIZER VACCINE 30MCG/0.3ML DOSE: CPT | Performed by: PHYSICIAN ASSISTANT

## 2021-03-18 RX ORDER — METOPROLOL SUCCINATE 25 MG/1
TABLET, EXTENDED RELEASE ORAL
Qty: 90 TABLET | Refills: 0 | Status: SHIPPED
Start: 2021-03-18 | End: 2021-04-26 | Stop reason: SDUPTHER

## 2021-04-26 ENCOUNTER — OFFICE VISIT (OUTPATIENT)
Dept: CARDIOLOGY CLINIC | Age: 66
End: 2021-04-26
Payer: MEDICARE

## 2021-04-26 VITALS
HEIGHT: 71 IN | HEART RATE: 63 BPM | WEIGHT: 172.5 LBS | BODY MASS INDEX: 24.15 KG/M2 | DIASTOLIC BLOOD PRESSURE: 70 MMHG | SYSTOLIC BLOOD PRESSURE: 118 MMHG | RESPIRATION RATE: 14 BRPM

## 2021-04-26 DIAGNOSIS — I10 ESSENTIAL HYPERTENSION: Primary | ICD-10-CM

## 2021-04-26 PROCEDURE — 4040F PNEUMOC VAC/ADMIN/RCVD: CPT | Performed by: INTERNAL MEDICINE

## 2021-04-26 PROCEDURE — 3017F COLORECTAL CA SCREEN DOC REV: CPT | Performed by: INTERNAL MEDICINE

## 2021-04-26 PROCEDURE — G8420 CALC BMI NORM PARAMETERS: HCPCS | Performed by: INTERNAL MEDICINE

## 2021-04-26 PROCEDURE — 99214 OFFICE O/P EST MOD 30 MIN: CPT | Performed by: INTERNAL MEDICINE

## 2021-04-26 PROCEDURE — 1036F TOBACCO NON-USER: CPT | Performed by: INTERNAL MEDICINE

## 2021-04-26 PROCEDURE — 1123F ACP DISCUSS/DSCN MKR DOCD: CPT | Performed by: INTERNAL MEDICINE

## 2021-04-26 PROCEDURE — 93000 ELECTROCARDIOGRAM COMPLETE: CPT | Performed by: INTERNAL MEDICINE

## 2021-04-26 PROCEDURE — G8427 DOCREV CUR MEDS BY ELIG CLIN: HCPCS | Performed by: INTERNAL MEDICINE

## 2021-04-26 RX ORDER — METOPROLOL SUCCINATE 25 MG/1
25 TABLET, EXTENDED RELEASE ORAL DAILY
Qty: 90 TABLET | Refills: 3 | Status: SHIPPED
Start: 2021-04-26 | End: 2022-08-31

## 2021-04-26 NOTE — PROGRESS NOTES
CHIEF COMPLAINT: HTN/Lipids/Dilated Ascending Aorta    HISTORY OF PRESENT ILLNESS: Patient is a 76 y.o. male seen at the request of Izabella Syed MD.       No CP or SOB. Past Medical History:   Diagnosis Date    Benign prostatic hypertrophy     Cancer ([facility] [address] 2015    skin / basal / stomach     ED (erectile dysfunction)     GERD (gastroesophageal reflux disease)     HTN (hypertension)     Hyperlipidemia, mixed     Hypothyroidism     Mitral valve prolapse 1988    Dr. Ramirez Guess yearly    Osteoarthritis     both  knee    PVC's (premature ventricular contractions)     follows with Dr. Nini Law       Patient Active Problem List   Diagnosis    Papilloma of nasopharynx    Essential hypertension    Pure hypercholesterolemia    Palpitations    Symptomatic PVCs    Acquired hypothyroidism    Atrial bigeminy       No Known Allergies    Current Outpatient Medications   Medication Sig Dispense Refill    metoprolol succinate (TOPROL XL) 25 MG extended release tablet TAKE ONE TABLET BY MOUTH EVERY DAY 90 tablet 0    Naproxen Sodium (ALEVE) 220 MG CAPS Take by mouth as needed for Pain      magnesium (MAGNESIUM-OXIDE) 250 MG TABS tablet Take 250 mg by mouth daily      amLODIPine (NORVASC) 5 MG tablet Take 5 mg by mouth daily      sildenafil (REVATIO) 20 MG tablet Take 20 mg by mouth as needed      levothyroxine (SYNTHROID) 25 MCG tablet Take 25 mcg by mouth Daily      aspirin EC 81 MG EC tablet Take 1 tablet by mouth daily (Patient taking differently: Take 81 mg by mouth daily Last dose 6-6-19) 30 tablet 3    Coenzyme Q10 (COQ-10 PO) Take by mouth daily LD 6-6-19      vitamin D (CHOLECALCIFEROL) 1000 UNIT TABS tablet Take 1,000 Units by mouth daily LD 6-6-19      Multiple Vitamin (MULTI VITAMIN DAILY PO) Take by mouth daily LD 6-6-19      simvastatin (ZOCOR) 10 MG tablet Take 10 mg by mouth daily       No current facility-administered medications for this visit.         Social History     Socioeconomic History    Marital status:      Spouse name: Not on file    Number of children: Not on file    Years of education: Not on file    Highest education level: Not on file   Occupational History    Not on file   Social Needs    Financial resource strain: Not on file    Food insecurity     Worry: Not on file     Inability: Not on file    Transportation needs     Medical: Not on file     Non-medical: Not on file   Tobacco Use    Smoking status: Never Smoker    Smokeless tobacco: Never Used   Substance and Sexual Activity    Alcohol use: Yes     Comment: social    Drug use: No    Sexual activity: Not on file   Lifestyle    Physical activity     Days per week: Not on file     Minutes per session: Not on file    Stress: Not on file   Relationships    Social connections     Talks on phone: Not on file     Gets together: Not on file     Attends Anabaptist service: Not on file     Active member of club or organization: Not on file     Attends meetings of clubs or organizations: Not on file     Relationship status: Not on file    Intimate partner violence     Fear of current or ex partner: Not on file     Emotionally abused: Not on file     Physically abused: Not on file     Forced sexual activity: Not on file   Other Topics Concern    Not on file   Social History Narrative    Not on file       Family History   Problem Relation Age of Onset    Ovarian Cancer Mother     Thyroid Disease Father     Dementia Father     Heart Attack Paternal Uncle     Heart Disease Maternal Grandfather     Cancer Paternal Grandfather      Review of Systems:  Heart: as above   Lungs: as above   Eyes: denies changes in vision or discharge. Ears: denies changes in hearing or pain. Nose: denies epistaxis or masses   Throat: denies sore throat or trouble swallowing. Neuro: denies numbness, tingling, tremors. Skin: denies rashes or itching.    : denies hematuria, dysuria   GI: denies vomiting, diarrhea   Psych: denies mood changed, anxiety, depression. All other systems negative. Physical Exam   /70   Pulse 63   Resp 14   Ht 5' 11\" (1.803 m)   Wt 172 lb 8 oz (78.2 kg)   BMI 24.06 kg/m²   Constitutional: Oriented to person, place, and time. Well-developed and well-nourished. No distress. Head: Normocephalic and atraumatic. Eyes: EOM are normal. Pupils are equal, round, and reactive to light. Neck: Normal range of motion. Neck supple. No hepatojugular reflux and no JVD present. Carotid bruit is not present. No tracheal deviation present. No thyromegaly present. Cardiovascular: Normal rate, regular rhythm, normal heart sounds and intact distal pulses. Exam reveals no gallop and no friction rub. No murmur heard. Pulmonary/Chest: Effort normal and breath sounds normal. No respiratory distress. No wheezes. No rales. No tenderness. Abdominal: Soft. Bowel sounds are normal. No distension and no mass. No tenderness. No rebound and no guarding. Musculoskeletal: Normal range of motion. No edema and no tenderness. Lymphadenopathy:   No cervical adenopathy. No groin adenopathy. Neurological: Alert and oriented to person, place, and time. Skin: Skin is warm and dry. No rash noted. Not diaphoretic. No erythema. Psychiatric: Normal mood and affect. Behavior is normal.     EKG:  NSR, nonspecific ST and T waves changes. ASSESSMENT AND PLAN:  Patient Active Problem List   Diagnosis    Papilloma of nasopharynx    Essential hypertension    Pure hypercholesterolemia    Palpitations    Symptomatic PVCs    Acquired hypothyroidism    Atrial bigeminy     1. Chest pain:    Symptoms persisted despite low risk ETT 4/16. Chest X-ray 8/6/16 with \"persistent prominence of the pulmonary hilum, concerning for adenopathy. \"    Coronary CTA 10/2016 to assess for CAD negative for significant CAD and no overt extra-cardiac issues. 2. Ascending AA: Followed by CTS.  Stable CT chest 12/4/2020 with ascending aorta

## 2021-05-24 ENCOUNTER — HOSPITAL ENCOUNTER (OUTPATIENT)
Age: 66
Discharge: HOME | End: 2021-05-26

## 2021-05-24 PROCEDURE — 88342 IMHCHEM/IMCYTCHM 1ST ANTB: CPT

## 2021-05-24 PROCEDURE — 88305 TISSUE EXAM BY PATHOLOGIST: CPT

## 2021-11-03 ENCOUNTER — HOSPITAL ENCOUNTER (OUTPATIENT)
Age: 66
Discharge: HOME | End: 2021-11-03
Payer: MEDICARE

## 2021-11-03 LAB — PROSTATE SPECIFIC ANTIGEN: 0.45 NG/ML (ref 0–4)

## 2021-11-03 PROCEDURE — 36415 COLL VENOUS BLD VENIPUNCTURE: CPT

## 2021-11-03 PROCEDURE — G0103 PSA SCREENING: HCPCS

## 2021-11-12 ASSESSMENT — KERATOMETRY
OD_AXISANGLE_DEGREES: 92
OS_K2POWER_DIOPTERS: 44.75
OD_AXISANGLE2_DEGREES: 12
OS_K1POWER_DIOPTERS: 44
OS_AXISANGLE_DEGREES: 78
OD_AXISANGLE2_DEGREES: 2
OS_AXISANGLE_DEGREES: 73
OS_K2POWER_DIOPTERS: 45
OD_AXISANGLE_DEGREES: 102
OD_K2POWER_DIOPTERS: 44.5
OS_AXISANGLE2_DEGREES: 163
OS_AXISANGLE2_DEGREES: 168
OS_K1POWER_DIOPTERS: 43.75
OD_K1POWER_DIOPTERS: 43.5
OD_K1POWER_DIOPTERS: 43.25
OD_K2POWER_DIOPTERS: 44.5

## 2021-11-12 ASSESSMENT — VISUAL ACUITY
OS_SC: 20/60 - SN
OS_CC: 20/20 SN
OD_SC: 20/40 - SN
OS_CC: 20/20 - SN
OD_CC: 20/20 SN
OS_CC: 20/30 - SN
OS_CC: 20/25 - SN
OD_CC: 20/20 SN

## 2021-11-12 ASSESSMENT — TONOMETRY
OD_IOP_MMHG: 16
OD_IOP_MMHG: 14
OS_IOP_MMHG: 16
OS_IOP_MMHG: 15

## 2021-11-23 ENCOUNTER — IMMUNIZATION (OUTPATIENT)
Dept: PRIMARY CARE CLINIC | Age: 66
End: 2021-11-23
Payer: MEDICARE

## 2021-11-23 PROCEDURE — 91300 COVID-19, PFIZER VACCINE 30MCG/0.3ML DOSE: CPT | Performed by: NURSE PRACTITIONER

## 2021-11-23 PROCEDURE — 0004A COVID-19, PFIZER VACCINE 30MCG/0.3ML DOSE: CPT | Performed by: NURSE PRACTITIONER

## 2022-05-17 ENCOUNTER — OFFICE VISIT (OUTPATIENT)
Dept: CARDIOLOGY CLINIC | Age: 67
End: 2022-05-17
Payer: MEDICARE

## 2022-05-17 VITALS
SYSTOLIC BLOOD PRESSURE: 128 MMHG | DIASTOLIC BLOOD PRESSURE: 78 MMHG | HEART RATE: 55 BPM | RESPIRATION RATE: 16 BRPM | BODY MASS INDEX: 23.8 KG/M2 | HEIGHT: 71 IN | WEIGHT: 170 LBS

## 2022-05-17 DIAGNOSIS — R00.2 PALPITATIONS: Primary | ICD-10-CM

## 2022-05-17 PROCEDURE — 4040F PNEUMOC VAC/ADMIN/RCVD: CPT | Performed by: INTERNAL MEDICINE

## 2022-05-17 PROCEDURE — G8420 CALC BMI NORM PARAMETERS: HCPCS | Performed by: INTERNAL MEDICINE

## 2022-05-17 PROCEDURE — 1036F TOBACCO NON-USER: CPT | Performed by: INTERNAL MEDICINE

## 2022-05-17 PROCEDURE — 99214 OFFICE O/P EST MOD 30 MIN: CPT | Performed by: INTERNAL MEDICINE

## 2022-05-17 PROCEDURE — 93000 ELECTROCARDIOGRAM COMPLETE: CPT | Performed by: INTERNAL MEDICINE

## 2022-05-17 PROCEDURE — G8427 DOCREV CUR MEDS BY ELIG CLIN: HCPCS | Performed by: INTERNAL MEDICINE

## 2022-05-17 RX ORDER — SILDENAFIL 100 MG/1
TABLET, FILM COATED ORAL
COMMUNITY
Start: 2022-03-22

## 2022-05-17 NOTE — PROGRESS NOTES
CHIEF COMPLAINT: HTN/Lipids/Dilated Ascending Aorta    HISTORY OF PRESENT ILLNESS: Patient is a 78 y.o. male seen at the request of Adeline Michele MD.       No CP or SOB.      Past Medical History:   Diagnosis Date    Benign prostatic hypertrophy     Cancer ([facility] [address] 2015    skin / basal / stomach     ED (erectile dysfunction)     GERD (gastroesophageal reflux disease)     HTN (hypertension)     Hyperlipidemia, mixed     Hypothyroidism     Mitral valve prolapse 1988    Dr. González Wheatley yearly    Osteoarthritis     both  knee    PVC's (premature ventricular contractions)     follows with Dr. Tod Wharton       Patient Active Problem List   Diagnosis    Papilloma of nasopharynx    Essential hypertension    Pure hypercholesterolemia    Palpitations    Symptomatic PVCs    Acquired hypothyroidism    Atrial bigeminy       No Known Allergies    Current Outpatient Medications   Medication Sig Dispense Refill    sildenafil (VIAGRA) 100 MG tablet TAKE 1 TABLET (100 MG) BY MOUTH AS NEEDED 1 HOUR PRIOR TO SEXUAL ACTIVITY      metoprolol succinate (TOPROL XL) 25 MG extended release tablet Take 1 tablet by mouth daily 90 tablet 3    Naproxen Sodium (ALEVE) 220 MG CAPS Take by mouth as needed for Pain      magnesium (MAGNESIUM-OXIDE) 250 MG TABS tablet Take 250 mg by mouth daily      amLODIPine (NORVASC) 5 MG tablet Take 5 mg by mouth daily      levothyroxine (SYNTHROID) 25 MCG tablet Take 25 mcg by mouth Daily      aspirin EC 81 MG EC tablet Take 1 tablet by mouth daily (Patient taking differently: Take 81 mg by mouth every other day ) 30 tablet 3    Coenzyme Q10 (COQ-10) 100 MG CAPS Take 100 mg by mouth daily LD 6-6-19      vitamin D (CHOLECALCIFEROL) 1000 UNIT TABS tablet Take 1,000 Units by mouth daily LD 6-6-19      simvastatin (ZOCOR) 10 MG tablet Take 10 mg by mouth daily      sildenafil (REVATIO) 20 MG tablet Take 20 mg by mouth as needed (Patient not taking: Reported on 5/17/2022)      Multiple Vitamin (MULTI VITAMIN DAILY PO) Take by mouth daily LD 6-6-19 (Patient not taking: Reported on 5/17/2022)       No current facility-administered medications for this visit. Social History     Socioeconomic History    Marital status:      Spouse name: Not on file    Number of children: Not on file    Years of education: Not on file    Highest education level: Not on file   Occupational History    Not on file   Tobacco Use    Smoking status: Never Smoker    Smokeless tobacco: Never Used   Vaping Use    Vaping Use: Never used   Substance and Sexual Activity    Alcohol use: Yes     Comment: social    Drug use: No    Sexual activity: Not on file   Other Topics Concern    Not on file   Social History Narrative    Not on file     Social Determinants of Health     Financial Resource Strain:     Difficulty of Paying Living Expenses: Not on file   Food Insecurity:     Worried About Running Out of Food in the Last Year: Not on file    Bhupinder of Food in the Last Year: Not on file   Transportation Needs:     Lack of Transportation (Medical): Not on file    Lack of Transportation (Non-Medical):  Not on file   Physical Activity:     Days of Exercise per Week: Not on file    Minutes of Exercise per Session: Not on file   Stress:     Feeling of Stress : Not on file   Social Connections:     Frequency of Communication with Friends and Family: Not on file    Frequency of Social Gatherings with Friends and Family: Not on file    Attends Yazdanism Services: Not on file    Active Member of Clubs or Organizations: Not on file    Attends Club or Organization Meetings: Not on file    Marital Status: Not on file   Intimate Partner Violence:     Fear of Current or Ex-Partner: Not on file    Emotionally Abused: Not on file    Physically Abused: Not on file    Sexually Abused: Not on file   Housing Stability:     Unable to Pay for Housing in the Last Year: Not on file    Number of [place] in the Last Year: Not on file    Unstable Housing in the Last Year: Not on file       Family History   Problem Relation Age of Onset    Ovarian Cancer Mother     Thyroid Disease Father     Dementia Father     Heart Attack Paternal Uncle     Heart Disease Maternal Grandfather     Cancer Paternal Grandfather      Review of Systems:  Heart: as above   Lungs: as above   Eyes: denies changes in vision or discharge. Ears: denies changes in hearing or pain. Nose: denies epistaxis or masses   Throat: denies sore throat or trouble swallowing. Neuro: denies numbness, tingling, tremors. Skin: denies rashes or itching. : denies hematuria, dysuria   GI: denies vomiting, diarrhea   Psych: denies mood changed, anxiety, depression. All other systems negative. Physical Exam   /78   Pulse 55   Resp 16   Ht 5' 11\" (1.803 m)   Wt 170 lb (77.1 kg)   BMI 23.71 kg/m²   Constitutional: Oriented to person, place, and time. Well-developed and well-nourished. No distress. Head: Normocephalic and atraumatic. Eyes: EOM are normal. Pupils are equal, round, and reactive to light. Neck: Normal range of motion. Neck supple. No hepatojugular reflux and no JVD present. Carotid bruit is not present. No tracheal deviation present. No thyromegaly present. Cardiovascular: Normal rate, regular rhythm, normal heart sounds and intact distal pulses. Exam reveals no gallop and no friction rub. No murmur heard. Pulmonary/Chest: Effort normal and breath sounds normal. No respiratory distress. No wheezes. No rales. No tenderness. Abdominal: Soft. Bowel sounds are normal. No distension and no mass. No tenderness. No rebound and no guarding. Musculoskeletal: Normal range of motion. No edema and no tenderness. Lymphadenopathy:   No cervical adenopathy. No groin adenopathy. Neurological: Alert and oriented to person, place, and time. Skin: Skin is warm and dry. No rash noted. Not diaphoretic. No erythema.    Psychiatric:

## 2022-06-09 NOTE — PROCEDURE NOTE: SURGICAL
<p>Prior to commencing surgery patient identification, surgical procedure, site, and side were confirmed by Dr. Ana Whitehead. Following topical proparacaine anesthesia, the patient was positioned at the YAG laser, a contact lens coupled to the cornea with methylcellulose and an axial posterior capsulotomy performed without complication using 3.4 Mj x 20. Excess methylcellulose was washed from the eye, one drop of Alphagan was instilled and the patient returned to the holding area having tolerated the procedure well and without complication. </p><p>MRN: [id number]</p>

## 2022-06-30 RX ORDER — PRAVASTATIN SODIUM 20 MG
TABLET ORAL
COMMUNITY

## 2022-08-02 PROBLEM — M72.2 PLANTAR FASCIITIS OF RIGHT FOOT: Status: ACTIVE | Noted: 2022-08-02

## 2022-08-02 PROBLEM — E66.9 OBESITY (BMI 30.0-34.9): Status: ACTIVE | Noted: 2022-08-02

## 2022-08-02 PROBLEM — H69.91 EUSTACHIAN TUBE DYSFUNCTION, RIGHT: Status: ACTIVE | Noted: 2022-08-02

## 2022-08-02 PROBLEM — E66.09 OTHER OBESITY DUE TO EXCESS CALORIES: Status: ACTIVE | Noted: 2022-08-02

## 2022-08-02 PROBLEM — J30.2 SEASONAL ALLERGIES: Status: ACTIVE | Noted: 2022-08-02

## 2022-08-02 PROBLEM — E78.5 HYPERLIPIDEMIA: Status: ACTIVE | Noted: 2022-08-02

## 2022-08-02 PROBLEM — R05.9 COUGH: Status: ACTIVE | Noted: 2022-08-02

## 2022-08-02 PROBLEM — J18.9 PNEUMONIA DUE TO INFECTIOUS ORGANISM: Status: ACTIVE | Noted: 2022-08-02

## 2022-08-02 PROBLEM — R53.81 MALAISE: Status: ACTIVE | Noted: 2022-08-02

## 2022-08-02 PROBLEM — S39.012A OVERUSE SYNDROME OF LOW BACK: Status: ACTIVE | Noted: 2022-08-02

## 2022-08-02 PROBLEM — H69.81 EUSTACHIAN TUBE DYSFUNCTION, RIGHT: Status: ACTIVE | Noted: 2022-08-02
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
## 2022-08-02 PROBLEM — M72.0 DUPUYTREN'S CONTRACTURE: Status: ACTIVE | Noted: 2022-08-02

## 2022-08-31 RX ORDER — METOPROLOL SUCCINATE 25 MG/1
TABLET, EXTENDED RELEASE ORAL
Qty: 90 TABLET | Refills: 3 | Status: SHIPPED | OUTPATIENT
Start: 2022-08-31

## 2022-09-01 PROBLEM — R05.9 COUGH: Status: RESOLVED | Noted: 2022-08-02 | Resolved: 2022-09-01

## 2022-12-15 PROBLEM — Z00.00 MEDICARE ANNUAL WELLNESS VISIT, SUBSEQUENT: Status: ACTIVE | Noted: 2022-12-15

## 2023-01-14 PROBLEM — Z00.00 MEDICARE ANNUAL WELLNESS VISIT, SUBSEQUENT: Status: RESOLVED | Noted: 2022-12-15 | Resolved: 2023-01-14

## 2023-01-24 ENCOUNTER — HOSPITAL ENCOUNTER (OUTPATIENT)
Age: 68
Discharge: HOME | End: 2023-01-24
Payer: MEDICARE

## 2023-01-24 LAB — PROSTATE SPECIFIC ANTIGEN: 0.31 NG/ML (ref 0–4)

## 2023-01-24 PROCEDURE — G0103 PSA SCREENING: HCPCS

## 2023-01-24 PROCEDURE — 36415 COLL VENOUS BLD VENIPUNCTURE: CPT

## 2023-02-17 NOTE — PATIENT DISCUSSION
Nurse's Notes                                                                                     

 [facility]                                                                 

Name: Isabel Resendiz                                                                               

Age: 49 yrs                                                                                       

Sex: Female                                                                                       

: 1973                                                                                   

MRN: [id number]                                                                                   

Arrival Date: 2023                                                                          

Time: 15:32                                                                                       

Account#: [id number]                                                                            

Bed 7                                                                                             

Private MD:                                                                                       

Diagnosis: Acute serous otitis media, left ear;Other peripheral vertigo, left ear                 

                                                                                                  

Presentation:                                                                                     

                                                                                             

15:37 Chief complaint: Patient states: had an episode of vertigo. the room started spinning,  iw  

      I got hot a sweaty and sick to my stomach , has had previous episodes in the past ,         

      happened about 45 minutes , symptoms have improved but not resolved, also has pain in       

      left ear. Coronavirus screen: At this time, the client does not indicate any symptoms       

      associated with coronavirus-19. Ebola Screen: Patient negative for fever greater than       

      or equal to 101.5 degrees Fahrenheit, and additional compatible Ebola Virus Disease         

      symptoms Patient denies exposure to infectious person. Patient denies travel to an          

      Ebola-affected area in the 21 days before illness onset. No symptoms or risks               

      identified at this time. Initial Sepsis Screen: Does the patient meet any 2 criteria?       

      No. Patient's initial sepsis screen is negative. Does the patient have a suspected          

      source of infection? No. Patient's initial sepsis screen is negative. Risk Assessment:      

      Do you want to hurt yourself or someone else? Patient reports no desire to harm self or     

      others. Onset of symptoms was 2023.                                            

15:37 Method Of Arrival: Ambulatory                                                           iw  

15:37 Acuity: ARNIE 3                                                                           iw  

                                                                                                  

Historical:                                                                                       

- Allergies:                                                                                      

15:39 PENICILLINS;                                                                            iw  

- PMHx:                                                                                           

15:39 Anxiety; Depression; Diverticulitis; Hypertension; palpitations; Sleep Apnea; SVT;      iw  

      Tachycardia;                                                                                

                                                                                                  

- Immunization history:: Adult Immunizations.                                                     

- Social history:: Smoking status: .                                                              

- Family history:: not pertinent.                                                                 

                                                                                                  

                                                                                                  

Screening:                                                                                        

15:53 [facility] ED Fall Risk Assessment (Adult) Score/Fall Risk Level 0 - 2 = Low Risk         hb  

      Oriented to surroundings, Maintained a safe environment, Educated pt \T\ family on fall     

      prevention, incl call for assistance when getting out of bed. Abuse screen: Denies          

      threats or abuse. Denies injuries from another. Nutritional screening: No deficits          

      noted. Tuberculosis screening: No symptoms or risk factors identified.                      

                                                                                                  

Assessment:                                                                                       

16:18 General: Appears in no apparent distress. comfortable, Behavior is calm, cooperative,   ko1 

      appropriate for age. Pain: Denies pain. Neuro: Reports dizziness, Denies weakness           

      blurred vision difficulty swallowing, paresthesias numbness headache photophobia            

      diplopia. Cardiovascular: No deficits noted. Respiratory: No deficits noted. GI: No         

      deficits noted. : No deficits noted. EENT: No deficits noted. Derm: No deficits           

      noted. Musculoskeletal: No deficits noted.                                                  

                                                                                                  

Vital Signs:                                                                                      

15:39  / 80; Pulse 65; Resp 16; Pulse Ox 97% on R/A; Weight 117.93 kg; Height 5 ft. 2   iw  

      in. (157.48 cm); Pain 6/10;                                                                 

16:44  / 78; Pulse 62; Resp 16; Pulse Ox 99% ;                                          ko1 

15:39 Body Mass Index 47.55 (117.93 kg, 157.48 cm)                                            iw  

                                                                                                  

ED Course:                                                                                        

15:32 Patient arrived in ED.                                                                  as  

15:34 Walker Euceda MD is Attending Physician.                                            rt  

15:39 Triage completed.                                                                       iw  

15:39 Arm band placed on.                                                                     iw  

15:41 Lesia Rios, RN is Primary Nurse.                                                     ko1 

15:53 Patient has correct armband on for positive identification.                             hb  

16:17 Troponin High Sensitivity Sent.                                                         ko1 

16:17 Lipase Sent.                                                                            ko1 

16:17 CMP Sent.                                                                               ko1 

16:17 CBC with Diff Sent.                                                                     ko1 

16:18 Inserted saline lock: 22 gauge in left forearm, using aseptic technique. Blood          ko1 

      collected.                                                                                  

16:43 Pulse ox on. NIBP on. Warm blanket given.                                               ko1 

16:44 Troponin High Sensitivity Sent.                                                         ko1 

16:44 Lipase Sent.                                                                            ko1 

16:44 CMP Sent.                                                                               ko1 

17:07 No provider procedures requiring assistance completed. IV discontinued, intact,         ko1 

      bleeding controlled, No redness/swelling at site. Pressure dressing applied.                

                                                                                                  

Administered Medications:                                                                         

No medications were administered                                                                  

                                                                                                  

                                                                                                  

Medication:                                                                                       

15:53 VIS not applicable for this client.                                                     hb  

                                                                                                  

Outcome:                                                                                          

17:09 Discharge ordered by MD.                                                                rt  

17:11 Discharged to home ambulatory.                                                          ko1 

17:11 Condition: stable                                                                           

17:11 Discharge instructions given to patient, Instructed on discharge instructions, follow       

      up and referral plans. medication usage, Demonstrated understanding of instructions,        

      follow-up care, medications, Prescriptions given X 1.                                       

17:14 Patient left the ED.                                                                    ko1 

                                                                                                  

Signatures:                                                                                       

Natasha Sharma Irene, RN                     RN   iw                                                   

Mechelle Osborn RN                     RN                                                      

Lseia Rios RN                       RN   ko1                                                  

Walker Euceda MD MD   rt                                                   

                                                                                                  

Corrections: (The following items were deleted from the chart)                                    

15:40 15:39 Pulse 65bpm; Resp 16bpm; Pulse Ox 97% RA; 117.93 kg; Height 5 ft. 2 in.; BMI:     iw  

      47.5; Pain 6/10; iw                                                                         

                                                                                                  

************************************************************************************************** S/P ALT OU.

## 2023-03-22 ENCOUNTER — TELEPHONE (OUTPATIENT)
Dept: CARDIOLOGY CLINIC | Age: 68
End: 2023-03-22

## 2023-03-22 DIAGNOSIS — R07.9 CHEST PAIN, UNSPECIFIED TYPE: Primary | ICD-10-CM

## 2023-03-22 NOTE — TELEPHONE ENCOUNTER
Patient states that he has been having intermittent PVCs and chest tightness for a few weeks, patient states he had symptoms on Thursday and they lasted for 15hours then resolved and he was able to go out and play golf, please advise

## 2023-03-22 NOTE — TELEPHONE ENCOUNTER
Can you arrange an exercise nuclear stress? Dx CP. Eleuterio Bryson D.O.   Cardiologist  Cardiology, [facility]

## 2023-03-24 ENCOUNTER — TELEPHONE (OUTPATIENT)
Dept: CARDIOLOGY | Age: 68
End: 2023-03-24

## 2023-03-24 NOTE — TELEPHONE ENCOUNTER
Left message on voice mail to remind patient of nuclear stress test appointment on March 28, 2023 at 0700. Instructions for test,medication list- hold metoprolol for 24hours, and COVID-19 preprocedure information left on voice mail. Asked patient to call with any questions or if unable to keep appointment.

## 2023-03-28 ENCOUNTER — HOSPITAL ENCOUNTER (OUTPATIENT)
Dept: CARDIOLOGY | Age: 68
Discharge: HOME | End: 2023-03-28
Payer: MEDICARE

## 2023-03-28 VITALS
DIASTOLIC BLOOD PRESSURE: 80 MMHG | BODY MASS INDEX: 24.08 KG/M2 | HEART RATE: 64 BPM | SYSTOLIC BLOOD PRESSURE: 120 MMHG | WEIGHT: 172 LBS | RESPIRATION RATE: 18 BRPM | HEIGHT: 71 IN

## 2023-03-28 DIAGNOSIS — R07.9 CHEST PAIN, UNSPECIFIED TYPE: Primary | ICD-10-CM

## 2023-03-28 LAB
LV EF: 76 %
LVEF MODALITY: NORMAL

## 2023-03-28 PROCEDURE — 3430000000 HC RX DIAGNOSTIC RADIOPHARMACEUTICAL: Performed by: INTERNAL MEDICINE

## 2023-03-28 PROCEDURE — 93017 CV STRESS TEST TRACING ONLY: CPT

## 2023-03-28 PROCEDURE — A9500 TC99M SESTAMIBI: HCPCS | Performed by: INTERNAL MEDICINE

## 2023-03-28 PROCEDURE — 2580000003 HC RX 258: Performed by: INTERNAL MEDICINE

## 2023-03-28 PROCEDURE — 78452 HT MUSCLE IMAGE SPECT MULT: CPT

## 2023-03-28 RX ORDER — SODIUM CHLORIDE 0.9 % (FLUSH) 0.9 %
10 SYRINGE (ML) INJECTION PRN
Status: DISCONTINUED | OUTPATIENT
Start: 2023-03-28 | End: 2023-03-29 | Stop reason: HOSPADM

## 2023-03-28 RX ORDER — COVID-19 ANTIGEN TEST
1 KIT MISCELLANEOUS PRN
COMMUNITY

## 2023-03-28 RX ORDER — TETRAKIS(2-METHOXYISOBUTYLISOCYANIDE)COPPER(I) TETRAFLUOROBORATE 1 MG/ML
10.7 INJECTION, POWDER, LYOPHILIZED, FOR SOLUTION INTRAVENOUS
Status: COMPLETED | OUTPATIENT
Start: 2023-03-28 | End: 2023-03-28

## 2023-03-28 RX ORDER — TETRAKIS(2-METHOXYISOBUTYLISOCYANIDE)COPPER(I) TETRAFLUOROBORATE 1 MG/ML
33.4 INJECTION, POWDER, LYOPHILIZED, FOR SOLUTION INTRAVENOUS
Status: COMPLETED | OUTPATIENT
Start: 2023-03-28 | End: 2023-03-28

## 2023-03-28 RX ADMIN — Medication 10.7 MILLICURIE: at 07:08

## 2023-03-28 RX ADMIN — SODIUM CHLORIDE, PRESERVATIVE FREE 10 ML: 5 INJECTION INTRAVENOUS at 07:08

## 2023-03-28 RX ADMIN — Medication 33.4 MILLICURIE: at 08:21

## 2023-03-28 RX ADMIN — SODIUM CHLORIDE, PRESERVATIVE FREE 10 ML: 5 INJECTION INTRAVENOUS at 08:22

## 2023-03-28 NOTE — PROCEDURES
[address] [facility] [address] [phone]                Exercise Stress Nuclear Gated SPECT Study    Name: [id number] Account Number: [de-identified]    :  1955      Sex: male              Date of Study:  3/28/2023    Height: 5' 11\" (180.3 cm)  Weight: 172 lb (78 kg)     Ordering Provider: Peter Chase DO          PCP: Leigh Syed MD      Cardiologist: Peter Chase DO                        Interpreting Physician: Peter Chase DO  _________________________________________________________________________________    Indication:   Detecting the presence and location of coronary artery disease    Clinical History:   Patient has no known history of coronary artery disease. Resting ECG:    Normal sinus rhythm and Nonspecific ST-T wave changes    Exercise: The patient exercised using a Leighton protocol, completing 8:59 minutes and reaching an estimated work load of 05.9 metabolic equivalents (METS). Resting HR was 73. Peak exercise heart rate was 150 ( 98% of maximum predicted heart rate for age). Baseline /80. Peak exercise /84. The blood pressure response to exercise was normal      Exercise was terminated due to heart rate attained. The patient experienced no chest pain with exercise. Exercise ECG:   The patient demonstrated  rare PVC  during exercise. With exercise, there were no ST segment changes of significance at the heart rate achieved. Roldan treadmill score was 9 implying low risk. IMAGING: Myocardial perfusion imaging was performed at rest 30-35 minutes following the intravenous injection of 10.7 mCi of (Tc-Sestamibi) followed by 10 ml of Normal Saline. At peak exercise, the patient was injected intravenously with 33.4 mCi of (Tc-Sestamibi) followed by 10 ml of Normal Saline. Gated post-stress tomographic imaging was performed 20-25 minutes after stress.

## 2023-03-28 NOTE — DISCHARGE INSTRUCTIONS
[address] [facility]      Instructions to Patients    The following are the instructions for patients who have had a procedure in our office today. Patient name: Jenaro Feliciano    Radionuclide Activity: 40mCi of 99mTc-Sestamibi    Date Administered: 3/28/2023    Expires: 48 hours after scheduled appointment time      Patient may resume normal activity unless otherwise instructed. Patient may resume medications as normal.  If the need should arise, patient may call [phone] between the hours of 8:00am-5:00pm.  After hours there is at least one physician on-call at all times for those patients needing assistance. Patients may call [phone] and the answering service will direct the patient to one of our physicians for assistance. After the patient's test if they are going to be leaving from an airport in the near future they should take this letter with them to verify the test and radionuclide used for their test.      This letter verifies that the above named bearer received an injection of a radionuclide for medical purpose/usage only.         Electronically signed by Bettye Ordaz on 3/28/2023 at 8:10 AM

## 2023-03-29 ENCOUNTER — TELEPHONE (OUTPATIENT)
Dept: CARDIOLOGY CLINIC | Age: 68
End: 2023-03-29

## 2023-06-02 ENCOUNTER — OFFICE VISIT (OUTPATIENT)
Dept: CARDIOLOGY CLINIC | Age: 68
End: 2023-06-02

## 2023-06-02 VITALS
HEIGHT: 71 IN | HEART RATE: 72 BPM | SYSTOLIC BLOOD PRESSURE: 138 MMHG | WEIGHT: 171.2 LBS | DIASTOLIC BLOOD PRESSURE: 70 MMHG | BODY MASS INDEX: 23.97 KG/M2 | RESPIRATION RATE: 16 BRPM

## 2023-06-02 DIAGNOSIS — R00.2 PALPITATIONS: Primary | ICD-10-CM

## 2023-06-02 NOTE — PROGRESS NOTES
CHIEF COMPLAINT: HTN/Lipids/Dilated Ascending Aorta    HISTORY OF PRESENT ILLNESS: Patient is a 70 y.o. male seen at the request of Essie Vásquez MD.       No CP or SOB. Past Medical History:   Diagnosis Date    Benign prostatic hypertrophy     Cancer ([facility] [address] 2015    skin / basal / stomach     ED (erectile dysfunction)     GERD (gastroesophageal reflux disease)     HTN (hypertension)     Hyperlipidemia, mixed     Hypothyroidism     Mitral valve prolapse 1988    Dr. Sangeetha Navarro yearly    Osteoarthritis     both  knee    PVC's (premature ventricular contractions)     follows with Dr. Sowmya De Santiago       Patient Active Problem List   Diagnosis    Papilloma of nasopharynx    Essential hypertension    Pure hypercholesterolemia    Palpitations    Symptomatic PVCs    Acquired hypothyroidism    Atrial bigeminy       No Known Allergies    Current Outpatient Medications   Medication Sig Dispense Refill    Naproxen Sodium 220 MG CAPS Take 1 tablet by mouth as needed for Pain      metoprolol succinate (TOPROL XL) 25 MG extended release tablet TAKE ONE TABLET BY MOUTH DAILY. NEEDS OFFICE VISIT FOR REFILLS. 90 tablet 3    sildenafil (VIAGRA) 100 MG tablet TAKE 1 TABLET (100 MG) BY MOUTH AS NEEDED 1 HOUR PRIOR TO SEXUAL ACTIVITY      magnesium (MAGNESIUM-OXIDE) 250 MG TABS tablet Take 1 tablet by mouth daily      amLODIPine (NORVASC) 5 MG tablet Take 1 tablet by mouth daily      levothyroxine (SYNTHROID) 25 MCG tablet Take 1 tablet by mouth Daily      aspirin EC 81 MG EC tablet Take 1 tablet by mouth daily (Patient taking differently: Take 1 tablet by mouth every other day) 30 tablet 3    Coenzyme Q10 (COQ-10) 100 MG CAPS Take 100 mg by mouth daily LD 6-6-19      vitamin D (CHOLECALCIFEROL) 1000 UNIT TABS tablet Take 1 tablet by mouth daily LD 6-6-19      simvastatin (ZOCOR) 10 MG tablet Take 1 tablet by mouth daily       No current facility-administered medications for this visit.        Social History     Socioeconomic History

## 2023-09-19 RX ORDER — METOPROLOL SUCCINATE 25 MG/1
25 TABLET, EXTENDED RELEASE ORAL DAILY
Qty: 90 TABLET | Refills: 3 | Status: SHIPPED | OUTPATIENT
Start: 2023-09-19

## 2023-10-10 ENCOUNTER — ESTABLISHED PATIENT (OUTPATIENT)
Facility: LOCATION | Age: 68
End: 2023-10-10

## 2023-10-10 DIAGNOSIS — H02.88B: ICD-10-CM

## 2023-10-10 DIAGNOSIS — H52.223: ICD-10-CM

## 2023-10-10 DIAGNOSIS — H25.13: ICD-10-CM

## 2023-10-10 DIAGNOSIS — H04.123: ICD-10-CM

## 2023-10-10 DIAGNOSIS — H02.88A: ICD-10-CM

## 2023-10-10 DIAGNOSIS — H43.813: ICD-10-CM

## 2023-10-10 PROCEDURE — 92015 DETERMINE REFRACTIVE STATE: CPT

## 2023-10-10 PROCEDURE — 92014 COMPRE OPH EXAM EST PT 1/>: CPT

## 2023-10-10 ASSESSMENT — VISUAL ACUITY
OS_SC: 20/50
OD_SC: 20/40-1
OD_CC: 20/30-1
OD_GLARE: 20/30-1
OU_SC: 20/30-2
OU_CC: 20/20
OD_CC: J2
OU_CC: J1
OS_CC: J2
OS_CC: 20/20
OS_GLARE: 20/20-2

## 2023-10-10 ASSESSMENT — TONOMETRY
OS_IOP_MMHG: 15
OD_IOP_MMHG: 17

## 2023-10-10 ASSESSMENT — KERATOMETRY
OD_K1POWER_DIOPTERS: 43.50
OD_AXISANGLE2_DEGREES: 11
OS_AXISANGLE_DEGREES: 68
OD_K2POWER_DIOPTERS: 44.75
OS_K1POWER_DIOPTERS: 44.00
OS_AXISANGLE2_DEGREES: 158
OS_K2POWER_DIOPTERS: 44.75
OD_AXISANGLE_DEGREES: 101

## 2023-11-09 ENCOUNTER — TELEPHONE (OUTPATIENT)
Dept: ADMINISTRATIVE | Age: 68
End: 2023-11-09

## 2023-11-09 ENCOUNTER — TELEPHONE (OUTPATIENT)
Dept: CARDIOLOGY CLINIC | Age: 68
End: 2023-11-09

## 2023-11-09 NOTE — TELEPHONE ENCOUNTER
I spoke with patient and sent a message to .
Pt calling for Cardiac Clearance for upcoming knee replacement surgery on: 12/20/23 by Dr. Sowmya Wagner. Was last seen in [address] [place] [address] by KM: 6/2/23. Please call pts cell phone number.
no

## 2023-11-09 NOTE — TELEPHONE ENCOUNTER
Patient needs cardiac clearance for a knee surgery scheduled on 12/20 with , patient was seen in 6/2023 and he denies any chest pain,SOB or palpitations and able to preform daily activities without any cardiac symptoms, please advise

## 2023-11-09 NOTE — TELEPHONE ENCOUNTER
Patient is an acceptable risk to proceed with surgery. Iva Prajapati D.O.   Cardiologist  Cardiology, [id number] [facility]

## 2023-12-04 ENCOUNTER — HOSPITAL ENCOUNTER (OUTPATIENT)
Dept: CT IMAGING | Age: 68
Discharge: HOME | End: 2023-12-06
Payer: MEDICARE

## 2023-12-04 DIAGNOSIS — M17.12 ARTHRITIS OF LEFT KNEE: ICD-10-CM

## 2023-12-04 PROCEDURE — 73700 CT LOWER EXTREMITY W/O DYE: CPT

## 2023-12-11 RX ORDER — MIDAZOLAM HYDROCHLORIDE 2 MG/2ML
1 INJECTION, SOLUTION INTRAMUSCULAR; INTRAVENOUS ONCE
OUTPATIENT
Start: 2023-12-11 | End: 2023-12-11

## 2023-12-11 RX ORDER — ROPIVACAINE HYDROCHLORIDE 5 MG/ML
30 INJECTION, SOLUTION EPIDURAL; INFILTRATION; PERINEURAL ONCE
OUTPATIENT
Start: 2023-12-11

## 2023-12-11 RX ORDER — FENTANYL CITRATE 50 UG/ML
50 INJECTION, SOLUTION INTRAMUSCULAR; INTRAVENOUS ONCE
OUTPATIENT
Start: 2023-12-11 | End: 2023-12-11

## 2023-12-11 NOTE — PROGRESS NOTES
MENDY with Kallie Escobar at [facility] to clarify surg admit order DOS 12/20/23

## 2023-12-12 ENCOUNTER — HOSPITAL ENCOUNTER (OUTPATIENT)
Dept: PREADMISSION TESTING | Age: 68
Discharge: HOME | End: 2023-12-12
Attending: ORTHOPAEDIC SURGERY | Admitting: ORTHOPAEDIC SURGERY
Payer: MEDICARE

## 2023-12-12 VITALS
HEIGHT: 71 IN | SYSTOLIC BLOOD PRESSURE: 110 MMHG | RESPIRATION RATE: 16 BRPM | DIASTOLIC BLOOD PRESSURE: 73 MMHG | WEIGHT: 175 LBS | BODY MASS INDEX: 24.5 KG/M2 | HEART RATE: 70 BPM | OXYGEN SATURATION: 97 % | TEMPERATURE: 97.8 F

## 2023-12-12 DIAGNOSIS — M17.12 PRIMARY OSTEOARTHRITIS OF LEFT KNEE: ICD-10-CM

## 2023-12-12 LAB
ANION GAP SERPL CALCULATED.3IONS-SCNC: 9 MMOL/L (ref 7–16)
BUN SERPL-MCNC: 13 MG/DL (ref 6–23)
CALCIUM SERPL-MCNC: 9.1 MG/DL (ref 8.6–10.2)
CHLORIDE SERPL-SCNC: 105 MMOL/L (ref 98–107)
CO2 SERPL-SCNC: 26 MMOL/L (ref 22–29)
CREAT SERPL-MCNC: 0.9 MG/DL (ref 0.7–1.2)
GFR SERPL CREATININE-BSD FRML MDRD: >60 ML/MIN/1.73M2
GLUCOSE SERPL-MCNC: 106 MG/DL (ref 74–99)
POTASSIUM SERPL-SCNC: 5.1 MMOL/L (ref 3.5–5)
SODIUM SERPL-SCNC: 140 MMOL/L (ref 132–146)

## 2023-12-12 PROCEDURE — 36415 COLL VENOUS BLD VENIPUNCTURE: CPT

## 2023-12-12 PROCEDURE — 80048 BASIC METABOLIC PNL TOTAL CA: CPT

## 2023-12-12 RX ORDER — ALPRAZOLAM 0.5 MG/1
0.5 TABLET ORAL NIGHTLY PRN
COMMUNITY

## 2023-12-12 ASSESSMENT — KOOS JR
HOW SEVERE IS YOUR KNEE STIFFNESS AFTER FIRST WAKING IN MORNING: 2
RISING FROM SITTING: 1
TWISING OR PIVOTING ON KNEE: 3
STRAIGHTENING KNEE FULLY: 2
KOOS JR TOTAL INTERVAL SCORE: 44.905
GOING UP OR DOWN STAIRS: 3
STANDING UPRIGHT: 3
BENDING TO THE FLOOR TO PICK UP OBJECT: 3

## 2023-12-12 NOTE — PROGRESS NOTES
[address] [employer] PRE-ADMISSION TESTING INSTRUCTIONS    The Preadmission Testing patient is instructed accordingly using the following criteria (check applicable):    ARRIVAL INSTRUCTIONS:  [x] Parking the day of Surgery is located in the Main Entrance lot. Upon entering the door, make an immediate right to the surgery reception desk    [x] Bring photo ID and insurance card    [x] Please be sure to arrange for responsible adult to provide transportation to and from the hospital    [x] Please arrange for responsible adult to be with you for the 24 hour period post procedure due to having anesthesia    [x] If you awake am of surgery not feeling well or have temperature >100 please call [phone]    GENERAL INSTRUCTIONS:    [x] Follow instructions for hydration that have been provided to you at your Pre-Admission Visit. Solid food until midnight then clear liquids. No gum, candy or mints. [x] You may brush your teeth    [x] Take medications as instructed    [x] Stop herbal supplements and vitamins 5 days prior to procedure    [x] Follow preop dosing of blood thinners per physician instructions    [x] No tobacco products within 24 hours of surgery     [x] No alcohol or illegal drug use within 24 hours of surgery.     [x] Jewelry, body piercings, eyeglasses, contact lenses and dentures are not permitted into surgery (bring cases)      [x] Please do not wear any nail polish, make up or hair products on the day of surgery    [x] You can expect a call the business day prior to procedure to notify you if your arrival time changes    [x] If you receive a survey after surgery we would greatly appreciate your comments    [x] Please notify surgeon if you develop any illness between now and time of surgery (cold, cough, sore throat, fever, nausea, vomiting) or any signs of infections  including skin, wounds, and dental.    [x]  The Outpatient Pharmacy is available to fill your prescription here on your

## 2023-12-20 PROBLEM — M17.12 PRIMARY OSTEOARTHRITIS OF LEFT KNEE: Status: ACTIVE | Noted: 2023-12-20

## 2024-01-07 PROBLEM — R74.8 ELEVATED ALKALINE PHOSPHATASE LEVEL: Status: ACTIVE | Noted: 2024-01-07

## 2024-02-06 PROBLEM — Z00.00 MEDICARE ANNUAL WELLNESS VISIT, SUBSEQUENT: Status: RESOLVED | Noted: 2022-12-15 | Resolved: 2024-02-06

## 2024-03-11 ENCOUNTER — HOSPITAL ENCOUNTER (OUTPATIENT)
Age: 69
Discharge: HOME | End: 2024-03-11

## 2024-03-11 ENCOUNTER — HOSPITAL ENCOUNTER (OUTPATIENT)
Age: 69
Discharge: HOME | End: 2024-03-11
Payer: MEDICARE

## 2024-03-11 LAB — PSA SERPL-MCNC: 0.48 NG/ML (ref 0–4)

## 2024-03-11 PROCEDURE — 36415 COLL VENOUS BLD VENIPUNCTURE: CPT

## 2024-03-11 PROCEDURE — G0103 PSA SCREENING: HCPCS

## 2024-09-16 LAB
PSA SERPL-MCNC: 0.48 NG/ML (ref 0–4)
PSA SERPL-MCNC: NORMAL NG/ML (ref 0–4)

## 2024-10-30 RX ORDER — METOPROLOL SUCCINATE 25 MG/1
25 TABLET, EXTENDED RELEASE ORAL DAILY
Qty: 90 TABLET | Refills: 0 | Status: SHIPPED | OUTPATIENT
Start: 2024-10-30

## 2024-11-15 ENCOUNTER — COMPREHENSIVE EXAM (OUTPATIENT)
Facility: LOCATION | Age: 69
End: 2024-11-15

## 2024-11-15 DIAGNOSIS — H25.13: ICD-10-CM

## 2024-11-15 DIAGNOSIS — H52.223: ICD-10-CM

## 2024-11-15 DIAGNOSIS — H02.88A: ICD-10-CM

## 2024-11-15 DIAGNOSIS — H04.123: ICD-10-CM

## 2024-11-15 DIAGNOSIS — H43.813: ICD-10-CM

## 2024-11-15 DIAGNOSIS — H02.88B: ICD-10-CM

## 2024-11-15 PROCEDURE — 92015 DETERMINE REFRACTIVE STATE: CPT

## 2024-11-15 PROCEDURE — 92014 COMPRE OPH EXAM EST PT 1/>: CPT

## 2025-02-11 RX ORDER — METOPROLOL SUCCINATE 25 MG/1
25 TABLET, EXTENDED RELEASE ORAL DAILY
Qty: 90 TABLET | Refills: 0 | Status: SHIPPED | OUTPATIENT
Start: 2025-02-11

## 2025-05-21 RX ORDER — METOPROLOL SUCCINATE 25 MG/1
TABLET, EXTENDED RELEASE ORAL
Qty: 90 TABLET | Refills: 0 | Status: SHIPPED | OUTPATIENT
Start: 2025-05-21

## 2025-06-16 ENCOUNTER — EMERGENCY VISIT (OUTPATIENT)
Age: 70
End: 2025-06-16

## 2025-06-16 DIAGNOSIS — H11.442: ICD-10-CM

## 2025-06-16 PROCEDURE — 99213 OFFICE O/P EST LOW 20 MIN: CPT

## 2025-06-27 ENCOUNTER — OFFICE VISIT (OUTPATIENT)
Dept: CARDIOLOGY CLINIC | Age: 70
End: 2025-06-27
Payer: MEDICARE

## 2025-06-27 ENCOUNTER — TELEPHONE (OUTPATIENT)
Dept: CARDIOLOGY CLINIC | Age: 70
End: 2025-06-27

## 2025-06-27 VITALS
TEMPERATURE: 97.7 F | BODY MASS INDEX: 24.68 KG/M2 | OXYGEN SATURATION: 97 % | SYSTOLIC BLOOD PRESSURE: 126 MMHG | RESPIRATION RATE: 18 BRPM | DIASTOLIC BLOOD PRESSURE: 70 MMHG | HEIGHT: 71 IN | HEART RATE: 69 BPM | WEIGHT: 176.3 LBS

## 2025-06-27 DIAGNOSIS — I71.20 THORACIC AORTIC ANEURYSM WITHOUT RUPTURE, UNSPECIFIED PART: ICD-10-CM

## 2025-06-27 DIAGNOSIS — R00.2 PALPITATIONS: ICD-10-CM

## 2025-06-27 DIAGNOSIS — I10 ESSENTIAL HYPERTENSION: Primary | ICD-10-CM

## 2025-06-27 DIAGNOSIS — I49.3 SYMPTOMATIC PVCS: ICD-10-CM

## 2025-06-27 PROCEDURE — G8420 CALC BMI NORM PARAMETERS: HCPCS | Performed by: INTERNAL MEDICINE

## 2025-06-27 PROCEDURE — 99214 OFFICE O/P EST MOD 30 MIN: CPT | Performed by: INTERNAL MEDICINE

## 2025-06-27 PROCEDURE — G8427 DOCREV CUR MEDS BY ELIG CLIN: HCPCS | Performed by: INTERNAL MEDICINE

## 2025-06-27 PROCEDURE — 1036F TOBACCO NON-USER: CPT | Performed by: INTERNAL MEDICINE

## 2025-06-27 PROCEDURE — 3074F SYST BP LT 130 MM HG: CPT | Performed by: INTERNAL MEDICINE

## 2025-06-27 PROCEDURE — G2211 COMPLEX E/M VISIT ADD ON: HCPCS | Performed by: INTERNAL MEDICINE

## 2025-06-27 PROCEDURE — 1123F ACP DISCUSS/DSCN MKR DOCD: CPT | Performed by: INTERNAL MEDICINE

## 2025-06-27 PROCEDURE — 1159F MED LIST DOCD IN RCRD: CPT | Performed by: INTERNAL MEDICINE

## 2025-06-27 PROCEDURE — 3017F COLORECTAL CA SCREEN DOC REV: CPT | Performed by: INTERNAL MEDICINE

## 2025-06-27 PROCEDURE — 3078F DIAST BP <80 MM HG: CPT | Performed by: INTERNAL MEDICINE

## 2025-06-27 PROCEDURE — 93000 ELECTROCARDIOGRAM COMPLETE: CPT | Performed by: INTERNAL MEDICINE

## 2025-06-27 RX ORDER — METOPROLOL SUCCINATE 25 MG/1
25 TABLET, EXTENDED RELEASE ORAL DAILY
Qty: 90 TABLET | Refills: 3 | Status: SHIPPED | OUTPATIENT
Start: 2025-06-27

## 2025-06-27 NOTE — PROGRESS NOTES
CHIEF COMPLAINT: HTN/Lipids/Dilated Ascending Aorta    HISTORY OF PRESENT ILLNESS: Patient is a 70 y.o. male seen at the request of Keenan Engel MD.       No CP or SOB.     Past Medical History:   Diagnosis Date    Benign prostatic hypertrophy     Cancer (HCC) 2015    skin / basal / stomach     ED (erectile dysfunction)     GERD (gastroesophageal reflux disease)     HTN (hypertension)     Hyperlipidemia, mixed     Hypothyroidism     Mitral valve prolapse 1988    Dr. YASMIN Oilva yearly    Osteoarthritis     both  knee    PVC's (premature ventricular contractions)     follows with Dr. Oliva       Patient Active Problem List   Diagnosis    Papilloma of nasopharynx    Essential hypertension    Pure hypercholesterolemia    Palpitations    Symptomatic PVCs    Acquired hypothyroidism    Atrial bigeminy    Primary osteoarthritis of left knee       No Known Allergies    Current Outpatient Medications   Medication Sig Dispense Refill    metoprolol succinate (TOPROL XL) 25 MG extended release tablet TAKE ONE TABLET BY MOUTH DAILY. **NEED OFFICE VISIT FOR REFILLS** 90 tablet 0    ALPRAZolam (XANAX) 0.5 MG tablet Take 1 tablet by mouth nightly as needed for Sleep.      Naproxen Sodium 220 MG CAPS Take 1 tablet by mouth as needed for Pain      sildenafil (VIAGRA) 100 MG tablet TAKE 1 TABLET (100 MG) BY MOUTH AS NEEDED 1 HOUR PRIOR TO SEXUAL ACTIVITY      magnesium (MAGNESIUM-OXIDE) 250 MG TABS tablet Take 1 tablet by mouth daily      amLODIPine (NORVASC) 5 MG tablet Take 1 tablet by mouth daily      levothyroxine (SYNTHROID) 25 MCG tablet Take 3 tablets by mouth Daily      aspirin EC 81 MG EC tablet Take 1 tablet by mouth daily (Patient taking differently: Take 1 tablet by mouth every other day) 30 tablet 3    Coenzyme Q10 (COQ-10) 100 MG CAPS Take 100 mg by mouth daily      vitamin D (CHOLECALCIFEROL) 1000 UNIT TABS tablet Take 1 tablet by mouth daily LD 6-6-19      simvastatin (ZOCOR) 10 MG tablet Take 1 tablet by mouth

## 2025-06-27 NOTE — TELEPHONE ENCOUNTER
CT chest WO contrast ordered today, medicare insurance so no auth required. Patient notified and given scheduling dept. Phone# to schedule.

## 2025-08-12 ENCOUNTER — HOSPITAL ENCOUNTER (OUTPATIENT)
Dept: CT IMAGING | Age: 70
Discharge: HOME | End: 2025-08-14
Attending: INTERNAL MEDICINE
Payer: MEDICARE

## 2025-08-12 DIAGNOSIS — I71.20 THORACIC AORTIC ANEURYSM WITHOUT RUPTURE, UNSPECIFIED PART: ICD-10-CM

## 2025-08-12 DIAGNOSIS — I49.3 SYMPTOMATIC PVCS: ICD-10-CM

## 2025-08-12 DIAGNOSIS — I10 ESSENTIAL HYPERTENSION: ICD-10-CM

## 2025-08-12 DIAGNOSIS — R00.2 PALPITATIONS: ICD-10-CM

## 2025-08-12 PROCEDURE — 71250 CT THORAX DX C-: CPT

## 2025-09-02 ENCOUNTER — TELEPHONE (OUTPATIENT)
Dept: CARDIOLOGY CLINIC | Age: 70
End: 2025-09-02

## 2025-09-02 ENCOUNTER — RESULTS FOLLOW-UP (OUTPATIENT)
Dept: CARDIOLOGY | Age: 70
End: 2025-09-02

## (undated) DEVICE — BLOCK BITE 60FR RUBBER ADLT DENTAL

## (undated) DEVICE — GRADUATE TRIANG MEASURE 1000ML BLK PRNT

## (undated) DEVICE — SPONGE GZ W4XL4IN RAYON POLY FILL CVR W/ NONWOVEN FAB

## (undated) DEVICE — FORCEPS BX OVL CUP FEN DISPOSABLE CAP L 160CM RAD JAW 4